# Patient Record
Sex: FEMALE | Race: WHITE | HISPANIC OR LATINO | Employment: OTHER | URBAN - METROPOLITAN AREA
[De-identification: names, ages, dates, MRNs, and addresses within clinical notes are randomized per-mention and may not be internally consistent; named-entity substitution may affect disease eponyms.]

---

## 2024-01-18 ENCOUNTER — ANCILLARY PROCEDURE (OUTPATIENT)
Dept: RADIOLOGY | Facility: CLINIC | Age: 64
End: 2024-01-18
Payer: MEDICARE

## 2024-01-18 ENCOUNTER — OFFICE VISIT (OUTPATIENT)
Dept: ORTHOPEDIC SURGERY | Facility: CLINIC | Age: 64
End: 2024-01-18
Payer: MEDICARE

## 2024-01-18 VITALS — BODY MASS INDEX: 30.56 KG/M2 | HEIGHT: 64 IN | WEIGHT: 179 LBS

## 2024-01-18 DIAGNOSIS — G89.29 CHRONIC PAIN OF BOTH KNEES: ICD-10-CM

## 2024-01-18 DIAGNOSIS — M25.562 CHRONIC PAIN OF BOTH KNEES: ICD-10-CM

## 2024-01-18 DIAGNOSIS — M25.561 CHRONIC PAIN OF BOTH KNEES: ICD-10-CM

## 2024-01-18 DIAGNOSIS — M17.0 ARTHRITIS OF BOTH KNEES: Primary | ICD-10-CM

## 2024-01-18 PROCEDURE — 73562 X-RAY EXAM OF KNEE 3: CPT | Mod: BILATERAL PROCEDURE | Performed by: RADIOLOGY

## 2024-01-18 PROCEDURE — 73562 X-RAY EXAM OF KNEE 3: CPT | Mod: 50

## 2024-01-18 PROCEDURE — 20610 DRAIN/INJ JOINT/BURSA W/O US: CPT | Performed by: ORTHOPAEDIC SURGERY

## 2024-01-18 PROCEDURE — 99213 OFFICE O/P EST LOW 20 MIN: CPT | Performed by: ORTHOPAEDIC SURGERY

## 2024-01-18 RX ORDER — TRIAMCINOLONE ACETONIDE 40 MG/ML
40 INJECTION, SUSPENSION INTRA-ARTICULAR; INTRAMUSCULAR
Status: COMPLETED | OUTPATIENT
Start: 2024-01-18 | End: 2024-01-18

## 2024-01-18 RX ORDER — LIDOCAINE HYDROCHLORIDE 10 MG/ML
2 INJECTION INFILTRATION; PERINEURAL
Status: COMPLETED | OUTPATIENT
Start: 2024-01-18 | End: 2024-01-18

## 2024-01-18 RX ADMIN — LIDOCAINE HYDROCHLORIDE 2 ML: 10 INJECTION INFILTRATION; PERINEURAL at 12:35

## 2024-01-18 RX ADMIN — TRIAMCINOLONE ACETONIDE 40 MG: 40 INJECTION, SUSPENSION INTRA-ARTICULAR; INTRAMUSCULAR at 12:35

## 2024-01-18 NOTE — PROGRESS NOTES
Subjective    Patient ID: Mary Beth Castro is a 63 y.o. female.    Chief Complaint: OTHER (F/U BILATERAL KNEE PAIN./)       63-year-old female with a long history of arthritis involving both knees.  In September of this year completed a HA injection series into both knees.  This was of some benefit but presently the right knee has now become more symptomatic.  Pain limits functions of ADL including standing, walking and stair climbing.  Patient is considering the potential options for knee replacement but is very concerned due to her struggles with pain management issues.  There has been no new injury.  She has not noted any redness nor warmth.    This patient's past medical, social, and family history were reviewed as well as a review of systems including updates on the patient's information encounter sheet    Physical Examination  Constitutional: Patient's height and weight reviewed, appears well kempt  Psychiatric: Alert and oriented ×3, appropriate mood and behavior  Pulmonary: Breathing appears nonlabored, no apparent distress  Lymphatic: No appreciable lymphadenopathy to both the upper and lower extremities  Skin: No open lesions, rashes, ulcerations  Neurologic: Gross motor and sensory exam appear intact (except for abnormalities noted in the below muscle skeletal exam)    Musculoskeletal: The right knee reveals lack of extension of a few degrees and flexion limited to 115 degrees.  Patellofemoral crepitus with range of motion.  Both medial and lateral joint line tenderness.  No gross ligamentous instability.    X-rays performed today reviewed by myself demonstrate advanced arthritic changes developing in both knees with joint space narrowing and osteophyte formation.    Assessment: Arthritis both knees right more symptomatic present in the left    Plan: An extended discussion ensued with the patient regarding the treatment options for their knee condition. This included both nonoperative and operative  treatment options.. The patient will continue with modifications of activities of daily living as well as an exercise program. As the patient desired an intra-articular knee injection of Kenalog/lidocaine was performed today and tolerated well.. The patient will observe to see if the injection is of benefit. Follow-up on a when necessary basis.    L Inj/Asp: R knee on 1/18/2024 12:35 PM  Indications: pain  Details: 22 G needle, anterolateral approach  Medications: 40 mg triamcinolone acetonide 40 mg/mL; 2 mL lidocaine 10 mg/mL (1 %)  Consent was given by the patient. Patient was prepped and draped in the usual sterile fashion.             No current outpatient medications on file.

## 2024-05-23 ENCOUNTER — OFFICE VISIT (OUTPATIENT)
Dept: ORTHOPEDIC SURGERY | Facility: CLINIC | Age: 64
End: 2024-05-23
Payer: MEDICARE

## 2024-05-23 DIAGNOSIS — G89.29 CHRONIC PAIN OF BOTH KNEES: ICD-10-CM

## 2024-05-23 DIAGNOSIS — M25.562 CHRONIC PAIN OF BOTH KNEES: ICD-10-CM

## 2024-05-23 DIAGNOSIS — M25.561 CHRONIC PAIN OF BOTH KNEES: ICD-10-CM

## 2024-05-23 DIAGNOSIS — M17.0 ARTHRITIS OF BOTH KNEES: Primary | ICD-10-CM

## 2024-05-23 PROCEDURE — 99213 OFFICE O/P EST LOW 20 MIN: CPT | Performed by: ORTHOPAEDIC SURGERY

## 2024-05-23 PROCEDURE — 20610 DRAIN/INJ JOINT/BURSA W/O US: CPT | Performed by: ORTHOPAEDIC SURGERY

## 2024-05-23 RX ORDER — TRIAMCINOLONE ACETONIDE 40 MG/ML
40 INJECTION, SUSPENSION INTRA-ARTICULAR; INTRAMUSCULAR
Status: COMPLETED | OUTPATIENT
Start: 2024-05-23 | End: 2024-05-23

## 2024-05-23 RX ORDER — LIDOCAINE HYDROCHLORIDE 10 MG/ML
2 INJECTION INFILTRATION; PERINEURAL
Status: COMPLETED | OUTPATIENT
Start: 2024-05-23 | End: 2024-05-23

## 2024-05-23 RX ADMIN — TRIAMCINOLONE ACETONIDE 40 MG: 40 INJECTION, SUSPENSION INTRA-ARTICULAR; INTRAMUSCULAR at 13:42

## 2024-05-23 RX ADMIN — LIDOCAINE HYDROCHLORIDE 2 ML: 10 INJECTION INFILTRATION; PERINEURAL at 13:42

## 2024-05-23 NOTE — PROGRESS NOTES
Subjective    Patient ID: Mary Beth Castro is a 64 y.o. female.    Chief Complaint: OTHER (F/U LT KNEE)       This is a 64-year-old female with a long history of arthritis involving both the right and the left knees.  Presently the right knee has been more symptomatic resulting in stiffness after prolonged sitting and pain with most functions of ADL such as standing, walking and stair climbing.  Patient has not had a new injury.  She has not noted any redness nor warmth.  She denies any fevers or chills.  An HA injection series into both knees that finished in September 2023 was of significant benefit.    This patient's past medical, social, and family history were reviewed as well as a review of systems including updates on the patient's information encounter sheet    Physical Examination  Constitutional: Patient's height and weight reviewed, appears well kempt  Psychiatric: Alert and oriented ×3, appropriate mood and behavior  Pulmonary: Breathing appears nonlabored, no apparent distress  Lymphatic: No appreciable lymphadenopathy to both the upper and lower extremities  Skin: No open lesions, rashes, ulcerations  Neurologic: Gross motor and sensory exam appear intact (except for abnormalities noted in the below muscle skeletal exam)    Musculoskeletal: There appears to be satisfactory range of motion of the right hip without groin or thigh pain elicited.  The right knee reveals full active extension.  Flexion 120 degrees.  Patellofemoral crepitus with range of motion.  Moderately positive patellar apprehension sign.  Localized medial joint line tenderness.  No gross ligamentous instability.      Assessment: Arthritis lateral knees with the right knee presently being most symptomatic    Plan: An extended discussion ensued with the patient regarding the treatment options for their knee condition. This included both nonoperative and operative treatment options.. The patient will continue with modifications of  activities of daily living as well as an exercise program. As the patient desired an intra-articular knee injection of Kenalog/lidocaine was performed today and tolerated well.. The patient will observe to see if the injection is of benefit. Follow-up on a when necessary basis.    L Inj/Asp: R knee on 5/23/2024 1:42 PM  Indications: pain  Details: 22 G needle, anterolateral approach  Medications: 40 mg triamcinolone acetonide 40 mg/mL; 2 mL lidocaine 10 mg/mL (1 %)  Consent was given by the patient. Patient was prepped and draped in the usual sterile fashion.             No current outpatient medications on file.

## 2024-09-26 ENCOUNTER — OFFICE VISIT (OUTPATIENT)
Dept: ORTHOPEDIC SURGERY | Facility: CLINIC | Age: 64
End: 2024-09-26
Payer: MEDICARE

## 2024-09-26 DIAGNOSIS — M25.561 CHRONIC PAIN OF RIGHT KNEE: ICD-10-CM

## 2024-09-26 DIAGNOSIS — M17.0 ARTHRITIS OF BOTH KNEES: Primary | ICD-10-CM

## 2024-09-26 DIAGNOSIS — G89.29 CHRONIC PAIN OF RIGHT KNEE: ICD-10-CM

## 2024-09-26 PROCEDURE — 99213 OFFICE O/P EST LOW 20 MIN: CPT | Performed by: ORTHOPAEDIC SURGERY

## 2024-09-26 PROCEDURE — 2500000004 HC RX 250 GENERAL PHARMACY W/ HCPCS (ALT 636 FOR OP/ED): Performed by: ORTHOPAEDIC SURGERY

## 2024-09-26 PROCEDURE — 20610 DRAIN/INJ JOINT/BURSA W/O US: CPT | Mod: RT | Performed by: ORTHOPAEDIC SURGERY

## 2024-09-26 PROCEDURE — 2500000005 HC RX 250 GENERAL PHARMACY W/O HCPCS: Performed by: ORTHOPAEDIC SURGERY

## 2024-09-26 RX ORDER — TRIAMCINOLONE ACETONIDE 40 MG/ML
40 INJECTION, SUSPENSION INTRA-ARTICULAR; INTRAMUSCULAR
Status: COMPLETED | OUTPATIENT
Start: 2024-09-26 | End: 2024-09-26

## 2024-09-26 RX ORDER — LIDOCAINE HYDROCHLORIDE 10 MG/ML
2 INJECTION, SOLUTION INFILTRATION; PERINEURAL
Status: COMPLETED | OUTPATIENT
Start: 2024-09-26 | End: 2024-09-26

## 2024-09-26 ASSESSMENT — ENCOUNTER SYMPTOMS: KNEE SWELLING: 1

## 2024-09-26 NOTE — PROGRESS NOTES
Subjective    Patient ID: Mary Beth Castro is a 64 y.o. female.    Chief Complaint: Follow-up of the Right Knee       This is a 64-year-old female who has a long history of arthritis involving both the right and the left knee.  Presently the right knee is much more symptomatic than the left knee.  The patient though has benefit in the past with intra-articular HA injection series into each knee.  Those last injections were in 2023.  There has been no new injury.  She has not noted any redness warmth and denies any fevers or chills.  The right knee pain at the present time is significant limit all functions of ADL including standing, walking and stair climbing.    This patient's past medical, social, and family history were reviewed as well as a review of systems including updates on the patient's information encounter sheet    Physical Examination  Constitutional: Patient's height and weight reviewed, appears well kempt  Psychiatric: Alert and oriented ×3, appropriate mood and behavior  Pulmonary: Breathing appears nonlabored, no apparent distress  Lymphatic: No appreciable lymphadenopathy to both the upper and lower extremities  Skin: No open lesions, rashes, ulcerations  Neurologic: Gross motor and sensory exam appear intact (except for abnormalities noted in the below muscle skeletal exam)    Musculoskeletal: There is satisfactory range of motion of each hip without groin or thigh pain elicited.  The right knee reveals a small to mild effusion without erythema or warmth while the left knee has no significant effusion.  Each knee demonstrates patellofemoral crepitus throughout range of motion with a lack of extension of 5 degrees bilaterally.  Right knee flexion is limited to 105 degrees while the left knee can get to approximate 115 degrees.  No gross ligamentous instability.    Assessment: Bilateral knee arthritis right worse than left    Plan: An extended discussion ensued with the patient regarding the  treatment options for their right knee condition. This included both nonoperative and operative treatment options.. The patient will continue with modifications of activities of daily living as well as an exercise program. As the patient desired an intra-articular knee injection of Kenalog/lidocaine was performed today and tolerated well.. The patient will observe to see if the injection is of benefit. Follow-up on a when necessary basis.    Patient wishes to proceed with intra-articular HA injection series into each knee in approximately 3 to 4 weeks.    Right Knee     L Inj/Asp: R knee on 9/26/2024 2:16 PM  Indications: pain  Details: 22 G needle, anterolateral approach  Medications: 40 mg triamcinolone acetonide 40 mg/mL; 2 mL lidocaine 10 mg/mL (1 %)  Consent was given by the patient. Patient was prepped and draped in the usual sterile fashion.             No current outpatient medications on file.

## 2024-10-04 DIAGNOSIS — M17.0 ARTHRITIS OF BOTH KNEES: ICD-10-CM

## 2024-10-08 RX ORDER — HYALURONATE SODIUM 30 MG/2 ML
30 SYRINGE (ML) INTRAARTICULAR WEEKLY
Qty: 12 ML | Refills: 0 | Status: SHIPPED | OUTPATIENT
Start: 2024-10-08 | End: 2024-11-13

## 2024-11-04 ENCOUNTER — OFFICE VISIT (OUTPATIENT)
Dept: ORTHOPEDIC SURGERY | Facility: CLINIC | Age: 64
End: 2024-11-04
Payer: MEDICARE

## 2024-11-04 DIAGNOSIS — M17.0 ARTHRITIS OF BOTH KNEES: Primary | ICD-10-CM

## 2024-11-04 PROCEDURE — 20610 DRAIN/INJ JOINT/BURSA W/O US: CPT | Mod: RT | Performed by: ORTHOPAEDIC SURGERY

## 2024-11-04 PROCEDURE — 99211 OFF/OP EST MAY X REQ PHY/QHP: CPT | Performed by: ORTHOPAEDIC SURGERY

## 2024-11-04 PROCEDURE — 20610 DRAIN/INJ JOINT/BURSA W/O US: CPT | Mod: LT | Performed by: ORTHOPAEDIC SURGERY

## 2024-11-04 PROCEDURE — 2500000004 HC RX 250 GENERAL PHARMACY W/ HCPCS (ALT 636 FOR OP/ED): Mod: JZ | Performed by: ORTHOPAEDIC SURGERY

## 2024-11-04 NOTE — PROGRESS NOTES
L Inj/Asp: L knee on 11/4/2024 2:11 PM  Indications: pain (Arthritis)  Details: 22 G needle, superolateral approach  Medications: 30 mg hyaluronan 30 mg/2 mL  Procedure, treatment alternatives, risks and benefits explained, specific risks discussed. Consent was given by the patient.       L Inj/Asp: R knee on 11/4/2024 2:12 PM  Indications: pain (Arthritis )  Details: 22 G needle, superolateral approach  Medications: 30 mg hyaluronan 30 mg/2 mL  Procedure, treatment alternatives, risks and benefits explained, specific risks discussed. Consent was given by the patient.

## 2024-11-11 ENCOUNTER — OFFICE VISIT (OUTPATIENT)
Dept: ORTHOPEDIC SURGERY | Facility: CLINIC | Age: 64
End: 2024-11-11
Payer: MEDICARE

## 2024-11-11 DIAGNOSIS — M17.0 ARTHRITIS OF BOTH KNEES: Primary | ICD-10-CM

## 2024-11-11 PROCEDURE — 99211 OFF/OP EST MAY X REQ PHY/QHP: CPT | Performed by: ORTHOPAEDIC SURGERY

## 2024-11-11 PROCEDURE — 20610 DRAIN/INJ JOINT/BURSA W/O US: CPT | Mod: LT | Performed by: ORTHOPAEDIC SURGERY

## 2024-11-11 PROCEDURE — 2500000004 HC RX 250 GENERAL PHARMACY W/ HCPCS (ALT 636 FOR OP/ED): Mod: JZ | Performed by: ORTHOPAEDIC SURGERY

## 2024-11-11 PROCEDURE — 20610 DRAIN/INJ JOINT/BURSA W/O US: CPT | Mod: RT | Performed by: ORTHOPAEDIC SURGERY

## 2024-11-11 NOTE — PROGRESS NOTES
L Inj/Asp: L knee on 11/11/2024 1:50 PM  Indications: pain (Arthritis)  Details: 22 G needle, superolateral approach  Medications: 30 mg hyaluronan 30 mg/2 mL  Procedure, treatment alternatives, risks and benefits explained, specific risks discussed. Consent was given by the patient.       L Inj/Asp: R knee on 11/11/2024 1:51 PM  Indications: pain (Arthritis )  Details: 22 G needle, superolateral approach  Medications: 30 mg hyaluronan 30 mg/2 mL  Procedure, treatment alternatives, risks and benefits explained, specific risks discussed. Consent was given by the patient.

## 2024-11-18 ENCOUNTER — OFFICE VISIT (OUTPATIENT)
Dept: ORTHOPEDIC SURGERY | Facility: CLINIC | Age: 64
End: 2024-11-18
Payer: MEDICARE

## 2024-11-18 VITALS — HEIGHT: 64 IN | BODY MASS INDEX: 30.56 KG/M2 | WEIGHT: 179 LBS

## 2024-11-18 DIAGNOSIS — M17.0 ARTHRITIS OF BOTH KNEES: Primary | ICD-10-CM

## 2024-11-18 PROCEDURE — 20610 DRAIN/INJ JOINT/BURSA W/O US: CPT | Mod: LT | Performed by: ORTHOPAEDIC SURGERY

## 2024-11-18 PROCEDURE — 2500000004 HC RX 250 GENERAL PHARMACY W/ HCPCS (ALT 636 FOR OP/ED): Mod: JZ | Performed by: ORTHOPAEDIC SURGERY

## 2024-11-18 PROCEDURE — 99211 OFF/OP EST MAY X REQ PHY/QHP: CPT | Performed by: ORTHOPAEDIC SURGERY

## 2024-11-18 PROCEDURE — 3008F BODY MASS INDEX DOCD: CPT | Performed by: ORTHOPAEDIC SURGERY

## 2024-11-18 PROCEDURE — 20610 DRAIN/INJ JOINT/BURSA W/O US: CPT | Mod: RT | Performed by: ORTHOPAEDIC SURGERY

## 2024-11-18 NOTE — PROGRESS NOTES
L Inj/Asp: L knee on 11/18/2024 1:31 PM  Indications: pain (Arthritis)  Details: 22 G needle, superolateral approach  Medications: 30 mg hyaluronan 30 mg/2 mL  Procedure, treatment alternatives, risks and benefits explained, specific risks discussed. Consent was given by the patient.       L Inj/Asp: R knee on 11/18/2024 1:31 PM  Indications: pain (Arthritis )  Details: 22 G needle, superolateral approach  Medications: 30 mg hyaluronan 30 mg/2 mL  Procedure, treatment alternatives, risks and benefits explained, specific risks discussed. Consent was given by the patient.

## 2025-03-17 ENCOUNTER — OFFICE VISIT (OUTPATIENT)
Dept: ORTHOPEDIC SURGERY | Facility: CLINIC | Age: 65
End: 2025-03-17
Payer: MEDICARE

## 2025-03-17 VITALS — BODY MASS INDEX: 33.8 KG/M2 | HEIGHT: 64 IN | WEIGHT: 198 LBS

## 2025-03-17 DIAGNOSIS — M17.0 ARTHRITIS OF BOTH KNEES: Primary | ICD-10-CM

## 2025-03-17 DIAGNOSIS — M25.561 CHRONIC PAIN OF RIGHT KNEE: ICD-10-CM

## 2025-03-17 DIAGNOSIS — G89.29 CHRONIC PAIN OF RIGHT KNEE: ICD-10-CM

## 2025-03-17 PROCEDURE — 2500000004 HC RX 250 GENERAL PHARMACY W/ HCPCS (ALT 636 FOR OP/ED)

## 2025-03-17 PROCEDURE — 3008F BODY MASS INDEX DOCD: CPT

## 2025-03-17 PROCEDURE — 99213 OFFICE O/P EST LOW 20 MIN: CPT | Mod: 25

## 2025-03-17 PROCEDURE — 20610 DRAIN/INJ JOINT/BURSA W/O US: CPT | Mod: RT

## 2025-03-17 PROCEDURE — 99213 OFFICE O/P EST LOW 20 MIN: CPT

## 2025-03-17 RX ORDER — TRIAMCINOLONE ACETONIDE 40 MG/ML
40 INJECTION, SUSPENSION INTRA-ARTICULAR; INTRAMUSCULAR
Status: COMPLETED | OUTPATIENT
Start: 2025-03-17 | End: 2025-03-17

## 2025-03-17 RX ORDER — LIDOCAINE HYDROCHLORIDE 20 MG/ML
2 INJECTION, SOLUTION INFILTRATION; PERINEURAL
Status: COMPLETED | OUTPATIENT
Start: 2025-03-17 | End: 2025-03-17

## 2025-03-17 RX ADMIN — LIDOCAINE HYDROCHLORIDE 2 ML: 20 INJECTION, SOLUTION INFILTRATION; PERINEURAL at 13:44

## 2025-03-17 RX ADMIN — TRIAMCINOLONE ACETONIDE 40 MG: 40 INJECTION, SUSPENSION INTRA-ARTICULAR; INTRAMUSCULAR at 13:44

## 2025-03-17 NOTE — PROGRESS NOTES
Subjective    Patient ID: Mary Beth Casrto is a 64 y.o. female.    Chief Complaint: Follow-up (F/U BILAT KNEES/)    HPI  This is a pleasant 64-year-old female presenting to the office for follow-up in regards to arthritis of both knees.  She has been seen in the past by Dr. Michael Lopresti, who is since retired.  She last received a right knee intra-articular steroid injection of Kenalog/lidocaine on September 26, 2024.  She completed viscosupplementation gel injections for both knees on November 18, 2024.  She states that cortisone and gel injections have been of significant benefit.  She actually states that she does not have any left knee pain today and it is feeling good.  She continues to experience pain, pointing directly to the lateral aspect of her right knee.  She has also noticed intermittent swelling and limited range of motion.  There is been no recent injury.  Right knee pain gives her significant difficulty with activities of daily living including prolonged standing walking and stair climbing.  She has difficulty getting in and out of a car due to right knee pain.  She has been taking Tylenol and occasional Aleve as needed for pain.      The patient's past medical, surgical, family, and social history as well as allergies and medications were reviewed and updated in the chart.    Objective   Ortho Exam  Pleasant and no acute distress. Walks with a antalgic gait.  Bilateral knees appearing without soft tissue swelling erythema or ecchymosis.  There is no warmth upon touch.  Patellofemoral crepitus noted with range of motion testing.   Right knee range of motion is 5-110°. There is a mild effusion. The knee is stable to varus and valgus stress Lachman and posterior drawer. There is generalized tenderness along lateral joint line. Left knee range of motion is 5-110°. There is a mild effusion. The knee is stable to varus and valgus stress Lachman and posterior drawer. There is generalized tenderness.  Both lower extremities are well perfused the skin is intact and muscle tone is adequate.    Image Results:  XR knee 3 views bilateral  Interpreted By:  Dalila Hernandez,   STUDY:  XR KNEE 3 VIEWS BILATERAL;  1/18/2024 11:32 am      INDICATION:  Signs/Symptoms:KNEE PAIN.      COMPARISON:  none      ACCESSION NUMBER(S):  [id number]      ORDERING CLINICIAN:  MICHAEL LOPRESTI      FINDINGS:  Three views bilateral knees.              Moderate to severe tricompartment degenerative changes of the right  knee and more moderate degenerative changes of the left knee. No  fracture or dislocation. No osseous lesion. Bilateral Small  quadriceps enthesophyte of the patella.      IMPRESSION  Moderate to severe bilateral knee osteoarthrosis, right greater than  left. Bilateral Small quadriceps enthesophyte of the patella.          MACRO  none      Signed by: Dalila Hernandez 1/18/2024 1:49 PM  Dictation workstation:   [id number]      Assessment/Plan   Encounter Diagnoses:  Arthritis of both knees    Chronic pain of right knee    Plan: Continue discussion with patient in regards to bilateral knee arthritis with review of conservative and surgical treatment options.  Patient understands that she may be a candidate for total knee replacement in the future, but would like to proceed with conservative treatment options as they have been of significant benefit in the past.  As patient desired, a right knee intra-articular steroid injection of Kenalog/lidocaine was provided and tolerated well.  She can receive these injections every 3 months as needed or when pain returns.  She can receive HA viscosupplementation gel injections every 6 months as needed.  She will continue with a home exercise program as well as modifications of activities of daily living.  She can follow-up as needed.     L Inj/Asp: R knee on 3/17/2025 1:44 PM  Indications: pain  Details: 22 G needle, superolateral approach  Medications: 40 mg triamcinolone acetonide 40  mg/mL; 2 mL lidocaine 20 mg/mL (2 %)  Procedure, treatment alternatives, risks and benefits explained, specific risks discussed. Consent was given by the patient.

## 2025-06-16 ENCOUNTER — OFFICE VISIT (OUTPATIENT)
Dept: ORTHOPEDIC SURGERY | Facility: CLINIC | Age: 65
End: 2025-06-16
Payer: MEDICARE

## 2025-06-16 VITALS — BODY MASS INDEX: 33.8 KG/M2 | HEIGHT: 64 IN | WEIGHT: 198 LBS

## 2025-06-16 DIAGNOSIS — M25.562 CHRONIC PAIN OF BOTH KNEES: ICD-10-CM

## 2025-06-16 DIAGNOSIS — G89.29 CHRONIC PAIN OF BOTH KNEES: ICD-10-CM

## 2025-06-16 DIAGNOSIS — M17.0 ARTHRITIS OF BOTH KNEES: Primary | ICD-10-CM

## 2025-06-16 DIAGNOSIS — M25.561 CHRONIC PAIN OF BOTH KNEES: ICD-10-CM

## 2025-06-16 PROCEDURE — 99212 OFFICE O/P EST SF 10 MIN: CPT

## 2025-06-16 PROCEDURE — 2500000004 HC RX 250 GENERAL PHARMACY W/ HCPCS (ALT 636 FOR OP/ED)

## 2025-06-16 PROCEDURE — 20610 DRAIN/INJ JOINT/BURSA W/O US: CPT | Mod: RT

## 2025-06-16 RX ORDER — LIDOCAINE HYDROCHLORIDE 20 MG/ML
2 INJECTION, SOLUTION INFILTRATION; PERINEURAL
Status: COMPLETED | OUTPATIENT
Start: 2025-06-16 | End: 2025-06-16

## 2025-06-16 RX ORDER — TRIAMCINOLONE ACETONIDE 40 MG/ML
40 INJECTION, SUSPENSION INTRA-ARTICULAR; INTRAMUSCULAR
Status: COMPLETED | OUTPATIENT
Start: 2025-06-16 | End: 2025-06-16

## 2025-06-16 RX ADMIN — LIDOCAINE HYDROCHLORIDE 2 ML: 20 INJECTION, SOLUTION INFILTRATION; PERINEURAL at 13:33

## 2025-06-16 RX ADMIN — TRIAMCINOLONE ACETONIDE 40 MG: 400 INJECTION, SUSPENSION INTRA-ARTICULAR; INTRAMUSCULAR at 13:33

## 2025-06-16 NOTE — PROGRESS NOTES
Subjective    Patient ID: Mary Beth Castro is a 65 y.o. female.    Chief Complaint: Follow-up (F/U LT KNEE/)     HPI  This is a pleasant 65-year-old female presenting to the office for follow-up in regards to bilateral knee arthritis.  She has been seen in the past by myself and Dr. Michael Lopresti, who is since retired.  I last provided patient a right knee intra-articular steroid injection of Kenalog/lidocaine approximately 3 months ago, which she states was of benefit.  She states that she recently had an ultrasound of the right lower extremity which was negative for DVT, but did show a popliteal cyst.  She does notice increased swelling to the right knee intermittently, but does complain of bilateral knee pain.  Cortisone and gel injections have been of benefit in the past.  Continues to experience pain, pointing directly to the lateral aspect of her right and left knee.  She will notice intermittent swelling and limited range of motion of the right knee.  There is been no recent injury or fall.  Pain inhibits her activity living including prolonged standing walking and stair climbing.  She does have difficulty getting up and down from stairs.  She will take occasional Aleve and Tylenol as needed for pain.  She will be leaving for [place] on Saturday and she will be there for a month.    The patient's past medical, surgical, family, and social history as well as allergies and medications were reviewed and updated in the chart.    Objective   Ortho Exam  Pleasant and no acute distress. Walks with a antalgic gait. Bilateral knees appearing without soft tissue swelling erythema or ecchymosis. There is no warmth upon touch. Patellofemoral crepitus noted with range of motion testing. Right knee range of motion is 5-110°. There is a mild effusion. The knee is stable to varus and valgus stress Lachman and posterior drawer. There is generalized tenderness along lateral joint line. Left knee range of motion is 5-110°.  There is a mild effusion. The knee is stable to varus and valgus stress Lachman and posterior drawer. There is generalized tenderness. Both lower extremities are well perfused the skin is intact and muscle tone is adequate.     Image Results:  XR knee 3 views bilateral  Interpreted By:  Dalila Hernandez,   STUDY:  XR KNEE 3 VIEWS BILATERAL;  1/18/2024 11:32 am      INDICATION:  Signs/Symptoms:KNEE PAIN.      COMPARISON:  none      ACCESSION NUMBER(S):  [id number]      ORDERING CLINICIAN:  MICHAEL LOPRESTI      FINDINGS:  Three views bilateral knees.              Moderate to severe tricompartment degenerative changes of the right  knee and more moderate degenerative changes of the left knee. No  fracture or dislocation. No osseous lesion. Bilateral Small  quadriceps enthesophyte of the patella.      IMPRESSION  Moderate to severe bilateral knee osteoarthrosis, right greater than  left. Bilateral Small quadriceps enthesophyte of the patella.          MACRO  none      Signed by: Dalila Hernandez 1/18/2024 1:49 PM  Dictation workstation:   [id number]      Assessment/Plan   Encounter Diagnoses:  Arthritis of both knees    Chronic pain of both knees    Plan: Discussion with patient in regards to bilateral knee arthritis with review of conservative and surgical treatment options.  She is aware she may be a candidate for total knee replacement in the future, but would like to proceed with conservative treatment options at this time.  As patient desired, a right knee intra-articular steroid injection of Kenalog/lidocaine was provided and tolerated well.  Patient is diabetic, does occasionally check her blood sugars, is not currently on insulin.  She is aware that cortisone can elevate her blood sugars over the next few days.  We have decided to perform injections preferably 1 to 2 weeks apart, but due to patient's trip to [place], I have advised that she can return Friday for the left knee injection if needed.  She can receive  HA viscosupplementation gel injections every 6 months as needed.  She will continue with a home exercise program as well as modifications of activities a day living.  She will follow-up Friday in regards to the left knee if she would like it injected prior to her 1 month trip to [place].    L Inj/Asp: R knee on 6/16/2025 1:33 PM  Indications: pain  Details: 22 G needle, superolateral approach  Medications: 40 mg triamcinolone acetonide 40 mg/mL; 2 mL lidocaine 20 mg/mL (2 %)  Procedure, treatment alternatives, risks and benefits explained, specific risks discussed. Consent was given by the patient.

## 2025-06-20 ENCOUNTER — APPOINTMENT (OUTPATIENT)
Dept: ORTHOPEDIC SURGERY | Facility: CLINIC | Age: 65
End: 2025-06-20
Payer: MEDICARE

## 2025-08-01 ENCOUNTER — TELEPHONE (OUTPATIENT)
Dept: ORTHOPEDIC SURGERY | Facility: CLINIC | Age: 65
End: 2025-08-01
Payer: MEDICARE

## 2025-08-01 NOTE — TELEPHONE ENCOUNTER
Hello and welcome back! Patient said this is not urgent but she has a few questions in regards to the gel injections she gets and would really like it if you can call her back at your convenience. When asked what kind of questions to see if I could either help or have someone help, she said she wants to speak to you because you are the one who would know to answer. Her best contact number is [phone]. Please advise, thank you!

## 2025-08-05 DIAGNOSIS — M17.0 ARTHRITIS OF BOTH KNEES: ICD-10-CM

## 2025-08-05 RX ORDER — HYALURONATE SODIUM 30 MG/2 ML
30 SYRINGE (ML) INTRAARTICULAR WEEKLY
Qty: 12 ML | Refills: 0 | Status: SHIPPED | OUTPATIENT
Start: 2025-08-05 | End: 2025-09-10